# Patient Record
Sex: FEMALE | Race: WHITE | NOT HISPANIC OR LATINO
[De-identification: names, ages, dates, MRNs, and addresses within clinical notes are randomized per-mention and may not be internally consistent; named-entity substitution may affect disease eponyms.]

---
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
## 2023-01-01 ENCOUNTER — EMERGENCY (EMERGENCY)
Facility: HOSPITAL | Age: 18
LOS: 1 days | Discharge: HOME | End: 2023-01-01
Attending: EMERGENCY MEDICINE | Admitting: EMERGENCY MEDICINE
Payer: COMMERCIAL

## 2023-01-01 VITALS
HEART RATE: 104 BPM | OXYGEN SATURATION: 97 % | RESPIRATION RATE: 16 BRPM | WEIGHT: 144.62 LBS | TEMPERATURE: 99 F | SYSTOLIC BLOOD PRESSURE: 114 MMHG | DIASTOLIC BLOOD PRESSURE: 84 MMHG | HEIGHT: 66 IN

## 2023-01-01 VITALS
DIASTOLIC BLOOD PRESSURE: 73 MMHG | HEART RATE: 78 BPM | TEMPERATURE: 98 F | OXYGEN SATURATION: 98 % | SYSTOLIC BLOOD PRESSURE: 111 MMHG | RESPIRATION RATE: 16 BRPM

## 2023-01-01 LAB
ALBUMIN SERPL ELPH-MCNC: 3.4 G/DL — SIGNIFICANT CHANGE UP (ref 3.3–5)
ALP SERPL-CCNC: 234 U/L — HIGH (ref 30–120)
ALT FLD-CCNC: 346 U/L DA — HIGH (ref 10–60)
ANION GAP SERPL CALC-SCNC: 10 MMOL/L — SIGNIFICANT CHANGE UP (ref 5–17)
APPEARANCE UR: CLEAR — SIGNIFICANT CHANGE UP
AST SERPL-CCNC: 226 U/L — HIGH (ref 10–40)
BASOPHILS # BLD AUTO: 0.13 K/UL — SIGNIFICANT CHANGE UP (ref 0–0.2)
BASOPHILS NFR BLD AUTO: 1 % — SIGNIFICANT CHANGE UP (ref 0–2)
BILIRUB SERPL-MCNC: 1.1 MG/DL — SIGNIFICANT CHANGE UP (ref 0.2–1.2)
BILIRUB UR-MCNC: NEGATIVE — SIGNIFICANT CHANGE UP
BUN SERPL-MCNC: 6 MG/DL — LOW (ref 7–23)
CALCIUM SERPL-MCNC: 8.9 MG/DL — SIGNIFICANT CHANGE UP (ref 8.4–10.5)
CHLORIDE SERPL-SCNC: 100 MMOL/L — SIGNIFICANT CHANGE UP (ref 96–108)
CO2 SERPL-SCNC: 24 MMOL/L — SIGNIFICANT CHANGE UP (ref 22–31)
COLOR SPEC: YELLOW — SIGNIFICANT CHANGE UP
CREAT SERPL-MCNC: 0.63 MG/DL — SIGNIFICANT CHANGE UP (ref 0.5–1.3)
DIFF PNL FLD: NEGATIVE — SIGNIFICANT CHANGE UP
EGFR: 131 ML/MIN/1.73M2 — SIGNIFICANT CHANGE UP
EOSINOPHIL # BLD AUTO: 0 K/UL — SIGNIFICANT CHANGE UP (ref 0–0.5)
EOSINOPHIL NFR BLD AUTO: 0 % — SIGNIFICANT CHANGE UP (ref 0–6)
FLUAV AG NPH QL: SIGNIFICANT CHANGE UP
FLUBV AG NPH QL: SIGNIFICANT CHANGE UP
GLUCOSE SERPL-MCNC: 119 MG/DL — HIGH (ref 70–99)
GLUCOSE UR QL: NEGATIVE MG/DL — SIGNIFICANT CHANGE UP
HCG UR QL: NEGATIVE — SIGNIFICANT CHANGE UP
HCT VFR BLD CALC: 33.8 % — LOW (ref 34.5–45)
HGB BLD-MCNC: 10.9 G/DL — LOW (ref 11.5–15.5)
KETONES UR-MCNC: NEGATIVE — SIGNIFICANT CHANGE UP
LEUKOCYTE ESTERASE UR-ACNC: NEGATIVE — SIGNIFICANT CHANGE UP
LIDOCAIN IGE QN: 123 U/L — SIGNIFICANT CHANGE UP (ref 73–393)
LYMPHOCYTES # BLD AUTO: 57 % — HIGH (ref 13–44)
LYMPHOCYTES # BLD AUTO: 7.64 K/UL — HIGH (ref 1–3.3)
MCHC RBC-ENTMCNC: 24 PG — LOW (ref 27–34)
MCHC RBC-ENTMCNC: 32.2 GM/DL — SIGNIFICANT CHANGE UP (ref 32–36)
MCV RBC AUTO: 74.3 FL — LOW (ref 80–100)
MONOCYTES # BLD AUTO: 0.8 K/UL — SIGNIFICANT CHANGE UP (ref 0–0.9)
MONOCYTES NFR BLD AUTO: 6 % — SIGNIFICANT CHANGE UP (ref 2–14)
NEUTROPHILS # BLD AUTO: 3.89 K/UL — SIGNIFICANT CHANGE UP (ref 1.8–7.4)
NEUTROPHILS NFR BLD AUTO: 29 % — LOW (ref 43–77)
NITRITE UR-MCNC: NEGATIVE — SIGNIFICANT CHANGE UP
NRBC # BLD: SIGNIFICANT CHANGE UP /100 WBCS (ref 0–0)
PH UR: 7 — SIGNIFICANT CHANGE UP (ref 5–8)
PLATELET # BLD AUTO: 162 K/UL — SIGNIFICANT CHANGE UP (ref 150–400)
POTASSIUM SERPL-MCNC: 3.7 MMOL/L — SIGNIFICANT CHANGE UP (ref 3.5–5.3)
POTASSIUM SERPL-SCNC: 3.7 MMOL/L — SIGNIFICANT CHANGE UP (ref 3.5–5.3)
PROT SERPL-MCNC: 7.7 G/DL — SIGNIFICANT CHANGE UP (ref 6–8.3)
PROT UR-MCNC: 15 MG/DL
RBC # BLD: 4.55 M/UL — SIGNIFICANT CHANGE UP (ref 3.8–5.2)
RBC # FLD: 16.1 % — HIGH (ref 10.3–14.5)
RSV RNA NPH QL NAA+NON-PROBE: SIGNIFICANT CHANGE UP
SARS-COV-2 RNA SPEC QL NAA+PROBE: SIGNIFICANT CHANGE UP
SODIUM SERPL-SCNC: 134 MMOL/L — LOW (ref 135–145)
SP GR SPEC: 1.01 — SIGNIFICANT CHANGE UP (ref 1.01–1.02)
UROBILINOGEN FLD QL: 4 MG/DL
WBC # BLD: 13.41 K/UL — HIGH (ref 3.8–10.5)
WBC # FLD AUTO: 13.41 K/UL — HIGH (ref 3.8–10.5)

## 2023-01-01 PROCEDURE — 83690 ASSAY OF LIPASE: CPT

## 2023-01-01 PROCEDURE — 76830 TRANSVAGINAL US NON-OB: CPT | Mod: 26

## 2023-01-01 PROCEDURE — 87637 SARSCOV2&INF A&B&RSV AMP PRB: CPT

## 2023-01-01 PROCEDURE — 74177 CT ABD & PELVIS W/CONTRAST: CPT | Mod: 26,MA

## 2023-01-01 PROCEDURE — 96374 THER/PROPH/DIAG INJ IV PUSH: CPT | Mod: XU

## 2023-01-01 PROCEDURE — 76830 TRANSVAGINAL US NON-OB: CPT

## 2023-01-01 PROCEDURE — 85025 COMPLETE CBC W/AUTO DIFF WBC: CPT

## 2023-01-01 PROCEDURE — 99284 EMERGENCY DEPT VISIT MOD MDM: CPT | Mod: 25

## 2023-01-01 PROCEDURE — 74177 CT ABD & PELVIS W/CONTRAST: CPT | Mod: MA

## 2023-01-01 PROCEDURE — 81025 URINE PREGNANCY TEST: CPT

## 2023-01-01 PROCEDURE — 80053 COMPREHEN METABOLIC PANEL: CPT

## 2023-01-01 PROCEDURE — 36415 COLL VENOUS BLD VENIPUNCTURE: CPT

## 2023-01-01 PROCEDURE — 96361 HYDRATE IV INFUSION ADD-ON: CPT

## 2023-01-01 PROCEDURE — 99284 EMERGENCY DEPT VISIT MOD MDM: CPT

## 2023-01-01 PROCEDURE — 81001 URINALYSIS AUTO W/SCOPE: CPT

## 2023-01-01 RX ORDER — ONDANSETRON 8 MG/1
4 TABLET, FILM COATED ORAL ONCE
Refills: 0 | Status: COMPLETED | OUTPATIENT
Start: 2023-01-01 | End: 2023-01-01

## 2023-01-01 RX ORDER — FAMOTIDINE 10 MG/ML
20 INJECTION INTRAVENOUS ONCE
Refills: 0 | Status: COMPLETED | OUTPATIENT
Start: 2023-01-01 | End: 2023-01-01

## 2023-01-01 RX ORDER — ONDANSETRON 8 MG/1
1 TABLET, FILM COATED ORAL
Qty: 12 | Refills: 0
Start: 2023-01-01 | End: 2023-01-04

## 2023-01-01 RX ORDER — SODIUM CHLORIDE 9 MG/ML
1000 INJECTION INTRAMUSCULAR; INTRAVENOUS; SUBCUTANEOUS ONCE
Refills: 0 | Status: COMPLETED | OUTPATIENT
Start: 2023-01-01 | End: 2023-01-01

## 2023-01-01 RX ADMIN — SODIUM CHLORIDE 1000 MILLILITER(S): 9 INJECTION INTRAMUSCULAR; INTRAVENOUS; SUBCUTANEOUS at 13:34

## 2023-01-01 RX ADMIN — SODIUM CHLORIDE 1000 MILLILITER(S): 9 INJECTION INTRAMUSCULAR; INTRAVENOUS; SUBCUTANEOUS at 12:20

## 2023-01-01 RX ADMIN — FAMOTIDINE 20 MILLIGRAM(S): 10 INJECTION INTRAVENOUS at 12:20

## 2023-01-01 NOTE — ED PROVIDER NOTE - NSFOLLOWUPINSTRUCTIONS_ED_ALL_ED_FT
Follow-up with the gastroenterologist and pediatrician for reevaluation, ongoing care and treatment.  Rest, drink plenty of fluids, take Zofran as needed for nausea.  Take over-the-counter MiraLAX as directed for constipation.  If having worsening of symptoms or other related symptoms, return to the ER immediately.    Abdominal Pain, Adult      Many things can cause belly (abdominal) pain. Most times, belly pain is not dangerous. Many cases of belly pain can be watched and treated at home. Sometimes, though, belly pain is serious. Your doctor will try to find the cause of your belly pain.      Follow these instructions at home:     Medicines     •Take over-the-counter and prescription medicines only as told by your doctor.      • Do not take medicines that help you poop (laxatives) unless told by your doctor.      General instructions     •Watch your belly pain for any changes.      •Drink enough fluid to keep your pee (urine) pale yellow.      •Keep all follow-up visits as told by your doctor. This is important.        Contact a doctor if:    •Your belly pain changes or gets worse.      •You are not hungry, or you lose weight without trying.      •You are having trouble pooping (constipated) or have watery poop (diarrhea) for more than 2–3 days.      •You have pain when you pee or poop.      •Your belly pain wakes you up at night.      •Your pain gets worse with meals, after eating, or with certain foods.      •You are vomiting and cannot keep anything down.      •You have a fever.      •You have blood in your pee.        Get help right away if:    •Your pain does not go away as soon as your doctor says it should.      •You cannot stop vomiting.      •Your pain is only in areas of your belly, such as the right side or the left lower part of the belly.      •You have bloody or black poop, or poop that looks like tar.      •You have very bad pain, cramping, or bloating in your belly.    •You have signs of not having enough fluid or water in your body (dehydration), such as:  •Dark pee, very little pee, or no pee.      •Cracked lips.      •Dry mouth.      •Sunken eyes.      •Sleepiness.      •Weakness.        •You have trouble breathing or chest pain.        Summary    •Many cases of belly pain can be watched and treated at home.      •Watch your belly pain for any changes.      •Take over-the-counter and prescription medicines only as told by your doctor.      •Contact a doctor if your belly pain changes or gets worse.      •Get help right away if you have very bad pain, cramping, or bloating in your belly.      This information is not intended to replace advice given to you by your health care provider. Make sure you discuss any questions you have with your health care provider.

## 2023-01-01 NOTE — ED ADULT NURSE NOTE - CHIEF COMPLAINT QUOTE
" I have stomach pain, mostly left side x 1 week, I got tested by my doctor for Mono today and he said it's positive and to got to an ER, I am also [id number] x 2 days "

## 2023-01-01 NOTE — ED ADULT NURSE NOTE - OBJECTIVE STATEMENT
· HPI Objective Statement: 17-year-old female with history of depression, anxiety presents with complaint of abdominal pain, nausea and vomiting x6 days.  Patient states that she had enlarged cervical lymph nodes that she noticed last week, was seen by her pediatrician 6 days ago and prescribed unknown antibiotics for possible sinus infection.  States that she started having fever, nausea, vomiting and abdominal pain immediately after starting antibiotics 6 days ago, noticed pruritic rash diffusely yesterday.  States that last bowel movement was 2 days ago.  Patient states that she was seen by pediatrician today, had blood work done and told that she has mono, advised to come to ER due to left-sided abdominal pain.  Denies history of abdominal surgeries, diarrhea, dysuria.

## 2023-01-01 NOTE — ED ADULT NURSE NOTE - NS PRO PASSIVE SMOKE EXP
From: Coco Mitchell  To: Fifi Hyman  Sent: 8/30/2021 1:32 PM CDT  Subject: Medication Question    Hello!  I am in need of a refill on my Vyvanse.    Thank you!    Coco Mitchell    No

## 2023-01-01 NOTE — ED PROVIDER NOTE - DIFFERENTIAL DIAGNOSIS
Differential Diagnosis differential includes but not limited to electrolyte abnormality flu COVID RSV ovarian cyst pancreatitis colitis diverticulitis or other intra-abdominal infection

## 2023-01-01 NOTE — ED ADULT TRIAGE NOTE - CHIEF COMPLAINT QUOTE
" I have stomach pain, mostly left side x 1 week, I got tested by my doctor for Mono today and he said it's positive and to got to an ER, I am also [id number] x 2 days "

## 2023-01-01 NOTE — ED PROVIDER NOTE - PROVIDER TOKENS
FREE:[LAST:[YOUR PEDIATRICIAN],PHONE:[(   )    -],FAX:[(   )    -],FOLLOWUP:[1-3 Days]],FREE:[LAST:[YOUR GASTROENTEROLOGIST],PHONE:[(   )    -],FAX:[(   )    -],FOLLOWUP:[1-3 Days]]

## 2023-01-01 NOTE — ED PROVIDER NOTE - PROGRESS NOTE DETAILS
Reevaluated patient at bedside.  Patient feeling much improved.  Discussed the results of all diagnostic testing in ED and copies of all reports given. Pt has GI at [facility] she will follow up with. Will rx zofran, advised to take otc miralax prn constipation, increase fluids, f/u GI and pcp.  An opportunity to ask questions was given.  Discussed the importance of prompt, close medical follow-up.  Patient will return with any changes, concerns or persistent / worsening symptoms.  Understanding of all instructions verbalized.

## 2023-01-01 NOTE — ED PROVIDER NOTE - OBJECTIVE STATEMENT
17-year-old female with history of depression, anxiety presents with complaint of abdominal pain, nausea and vomiting x6 days.  Patient states that she had enlarged cervical lymph nodes that she noticed last week, was seen by her pediatrician 6 days ago and prescribed unknown antibiotics for possible sinus infection.  States that she started having fever, nausea, vomiting and abdominal pain immediately after starting antibiotics 6 days ago, noticed pruritic rash diffusely yesterday.  States that last bowel movement was 2 days ago.  Patient states that she was seen by pediatrician today, had blood work done and told that she has mono, advised to come to ER due to left-sided abdominal pain.  Denies history of abdominal surgeries, diarrhea, dysuria.

## 2023-01-01 NOTE — ED PROVIDER NOTE - PATIENT PORTAL LINK FT
You can access the FollowMyHealth Patient Portal offered by [facility] by registering at the following website: [email or web address]. By joining [employer]’s FollowMyHealth portal, you will also be able to view your health information using other applications (apps) compatible with our system.

## 2023-01-01 NOTE — ED ADULT NURSE NOTE - NSIMPLEMENTINTERV_GEN_ALL_ED
Implemented All Universal Safety Interventions:  [place] to call system. Call bell, personal items and telephone within reach. Instruct patient to call for assistance. Room bathroom lighting operational. Non-slip footwear when patient is off stretcher. Physically safe environment: no spills, clutter or unnecessary equipment. Stretcher in lowest position, wheels locked, appropriate side rails in place.

## 2023-01-01 NOTE — ED PROVIDER NOTE - CLINICAL SUMMARY MEDICAL DECISION MAKING FREE TEXT BOX
Patient complaining of abdominal pain nausea vomiting for approximately 6 days.  Patient relates she was seen by her pediatrician for enlarged posterior cervical nodes was prescribed an antibiotic for possible sinus infection (does not know name of antibiotic) and same day developed abdominal pain (left greater than right) nausea and vomiting and an itchy rash to her chest.  Patient reports no diarrhea has not been constipated past few days.  Patient was seen by pediatrician today told that she has mono and recommended to come to the ER for imaging due to the left-sided abdominal pain.  No chest pain shortness of dysuria hematuria frequency.    Plan labs CT abdomen pelvis pelvic ultrasound IV fluid Pepcid Zofran   differential includes but not limited to electrolyte abnormality flu COVID RSV ovarian cyst pancreatitis colitis diverticulitis or other intra-abdominal infection

## 2023-01-01 NOTE — ED PROVIDER NOTE - NS ED ATTENDING STATEMENT MOD
This was a shared visit with the DEWAYNE. I reviewed and verified the documentation and independently performed the documented:

## 2023-01-01 NOTE — ED PROVIDER NOTE - CARE PROVIDER_API CALL
YOUR PEDIATRICIAN,   Phone: (   )    -  Fax: (   )    -  Follow Up Time: 1-3 Days    YOUR GASTROENTEROLOGIST,   Phone: (   )    -  Fax: (   )    -  Follow Up Time: 1-3 Days

## 2023-01-06 NOTE — ED ADULT NURSE NOTE - AS SC BRADEN MOBILITY
Hemigard Intro: Due to skin fragility and wound tension, it was decided to use HEMIGARD adhesive retention suture devices to permit a linear closure. The skin was cleaned and dried for a 6cm distance away from the wound. Excessive hair, if present, was removed to allow for adhesion. (4) no limitation